# Patient Record
Sex: FEMALE | Race: WHITE | NOT HISPANIC OR LATINO | Employment: UNEMPLOYED | URBAN - NONMETROPOLITAN AREA
[De-identification: names, ages, dates, MRNs, and addresses within clinical notes are randomized per-mention and may not be internally consistent; named-entity substitution may affect disease eponyms.]

---
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
## 2025-06-26 ENCOUNTER — TELEPHONE (OUTPATIENT)
Dept: FAMILY MEDICINE | Facility: OTHER | Age: 20
End: 2025-06-26

## 2025-06-26 NOTE — TELEPHONE ENCOUNTER
Symptom or reason needing to speak to RN: blood in stool since Sunday/not large amounts but it is there/painful when going bathroom/patient also having cyst behind her ear causing pain when turning her head. This is new in the last 2 days.     Best number to return call: [phone]     Best time to return call: [facility]